# Patient Record
Sex: FEMALE | Race: WHITE
[De-identification: names, ages, dates, MRNs, and addresses within clinical notes are randomized per-mention and may not be internally consistent; named-entity substitution may affect disease eponyms.]

---

## 2017-04-04 ENCOUNTER — HOSPITAL ENCOUNTER (OUTPATIENT)
Dept: HOSPITAL 58 - RAD | Age: 82
Discharge: HOME | End: 2017-04-04
Attending: FAMILY MEDICINE

## 2017-04-04 VITALS — BODY MASS INDEX: 33.4 KG/M2

## 2017-04-04 DIAGNOSIS — M25.551: Primary | ICD-10-CM

## 2017-04-04 NOTE — DI
EXAM:  RIGHT HIP, 2 VIEWS 

  

HISTORY:  Right hip pain with no injury 

  

FINDINGS:  Mild loss of articular cartilage width in the weightbearing portion of the hip joint cons
istent with osteoarthritis.  General bone density is within normal limits.  No fracture or joint dis
location.  No discrete soft tissue abnormality. 

  

  

IMPRESSION: 

Early osteoarthritis of the hip.

## 2017-04-04 NOTE — DI
EXAM:  Radiographs, pelvis 

  

HISTORY:  Right hip pain. 

  

COMPARISON:  None available. 

  

TECHNIQUE:  Single frontal view. 

  

  

FINDINGS:  Bone mineralization is decreased.  There is no fracture or dislocation.  The hip joint sp
aces are maintained.  Degenerative changes of the lower lumbar spine and pubic symphysis noted.  No 
focal soft tissue abnormality is seen. 

  

---------------------------- 

IMPRESSION: 

No fracture or dislocation.

## 2017-06-05 ENCOUNTER — HOSPITAL ENCOUNTER (OUTPATIENT)
Dept: HOSPITAL 58 - RAD | Age: 82
Discharge: HOME | End: 2017-06-05
Attending: FAMILY MEDICINE

## 2017-06-05 VITALS — BODY MASS INDEX: 33.4 KG/M2

## 2017-06-05 DIAGNOSIS — Z86.718: ICD-10-CM

## 2017-06-05 DIAGNOSIS — I82.409: Primary | ICD-10-CM

## 2017-06-05 NOTE — US
EXAM:  Ultrasound venous Doppler bilateral lower extremity 

  

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 

  

COMPARISON:  05/04/2016 

  

TECHNIQUE:  Venous duplex ultrasound of the right and left lower extremity was performed using color
, gray-scale, and Doppler flow imaging. 

  

FINDINGS: 

 Redemonstration of partial compressibility and flow of the left common femoral, profunda femoral, f
emoral, popliteal veins, grossly unchanged to the prior examination. 

  

There is normal color flow and gray scale appearance of the right common femoral, greater saphenous,
 profunda femoral, femoral, popliteal, peroneal, posterior tibial, and anterior tibial veins without
 evidence of intraluminal thrombus.  Compression and augmentation is normal. 

  

IMPRESSION: 

1.  Redemonstration left lower extremity deep venous thrombosis, grossly unchanged from 05/04/2016 

2.  No right lower extremity deep venous thrombosis.

## 2019-07-08 ENCOUNTER — HOSPITAL ENCOUNTER (EMERGENCY)
Dept: HOSPITAL 58 - ED | Age: 84
Discharge: HOME | End: 2019-07-08

## 2019-07-08 VITALS — TEMPERATURE: 98.6 F | SYSTOLIC BLOOD PRESSURE: 198 MMHG | DIASTOLIC BLOOD PRESSURE: 79 MMHG

## 2019-07-08 VITALS — BODY MASS INDEX: 32.3 KG/M2

## 2019-07-08 DIAGNOSIS — I10: ICD-10-CM

## 2019-07-08 DIAGNOSIS — Z79.899: ICD-10-CM

## 2019-07-08 DIAGNOSIS — J02.9: Primary | ICD-10-CM

## 2019-07-08 PROCEDURE — 99283 EMERGENCY DEPT VISIT LOW MDM: CPT

## 2019-07-08 PROCEDURE — 93010 ELECTROCARDIOGRAM REPORT: CPT

## 2019-07-08 PROCEDURE — 36415 COLL VENOUS BLD VENIPUNCTURE: CPT

## 2019-07-08 PROCEDURE — 93005 ELECTROCARDIOGRAM TRACING: CPT

## 2019-07-08 PROCEDURE — 85025 COMPLETE CBC W/AUTO DIFF WBC: CPT

## 2019-07-08 PROCEDURE — 96374 THER/PROPH/DIAG INJ IV PUSH: CPT

## 2019-07-08 PROCEDURE — 80053 COMPREHEN METABOLIC PANEL: CPT

## 2019-07-08 PROCEDURE — 96375 TX/PRO/DX INJ NEW DRUG ADDON: CPT

## 2019-07-08 NOTE — CT
EXAM:  CT NECK 

  

HISTORY:  Neck swelling, difficulty swallowing/breathing 

  

TECHNIQUE:  CT neck without intravenous contrast.  3-mm axial sections.  Coronal and sagittal reforma
tions. 

  

FINDINGS: No comparison CT. 

  

The tongue and possibly lingual tonsillar tissues are prominent in size.  This narrows the oropharyng
eal airway to some degree.  Epiglottis appears normal.  There is no parapharyngeal fluid collection i
dentified.  No lymphadenopathy is seen.  Incidental note of symmetrically enlarged bilateral submandi
bular glands.  The parotid glands are grossly within normal limits.  There are scattered calcificatio
ns in the right parapharyngeal soft tissues and near the anterior left parotid gland which may be dys
trophic in nature or least the left-sided entity could represent a ductal calculus.  These calcificat
ions measure about 2.5 mm or less.  There is no evidence of inflammatory fatty infiltration of the ne
ck or salivary glands.  Mastoid air cells are aerated.  The visualized paranasal sinuses are clear.  
The bones reveal no acute finding.  Upper lung fields are clear. 

  

  

IMPRESSION:  The tongue and possibly lingual tonsillar tissues are prominent in size.  Without compar
keri studies, it is difficult to determine whether this is normal for the patient or evidence of salo
a/inflammation, or other pathology.  This narrows the oropharyngeal airway to some degree.  Epiglotti
s appears normal.  Other findings as described above.

## 2019-07-08 NOTE — ED.PDOC
General


ED Provider: 


Dr. VIJI CASTELLANOS





Chief Complaint: Sore Throat


Stated Complaint: thinks that her throat is closing off no evidence of dyspnea 

, able to drink not short of birth


Time Seen by Physician: 14:30 (no air way issues on arrival  able to drink saw 

with kailey HUFFMAN SAW PT AT 5 PM IN ED )


Mode of Arrival: Walk-In


Information Source: Patient, Family


Exam Limitations: No limitations


Primary Care Provider: 


YUDITH MCCANN





Nursing and Triage Documentation Reviewed and Agree: Yes


Does patient meet sepsis criteria?: No


System Inflammatory Response Syndrome: Not Applicable


Sepsis Protocol: 


For patient's 13 years and over:





Temp is 96.8 and below  and greater


Pulse >90 BPM


Resp >20/minute


Acutely Altered Mental Status





Are patient's symptoms suggestive of a new infection, such as:


   -Pneumonia


   -Skin, Soft Tissue


   -Endocarditis


   -UTI


   -Bone, Joint Infection


   -Implantable Device


   -Acute Abdominal Infection


   -Wound Infection


   -Meningitis


   -Blood Stream Catheter Infection


   -Unknown








EENT Complaint Exam





- Throat Complaint/Exam


Onset/Duration: TODAY


Symptoms Are: Resolved


Timimg: Intermittent


Initial Severity: Mild


Current Severity: None


Aggravating: Reports: None


Alleviating: Reports: None


Associated Signs and Symptoms: Denies: Fever, Dysphagia, Drooling, Foreign body 

sensation, Chills, Cough, Wheezing, Hoarseness, Sinus discomfort, Nasal 

congestion, Difficulty breathing, Lethargy, Irritability, Decreased activity, 

Vomiting, Diarrhea, Decreased hearing, Ear drainage


Uvula Midline: Yes


Marybel-tonsillar Fluctuence: No


Scarlatinaform Rash Present: No


Lesions: Absent: Lip, Gums, Tongue, Buccal Mucosa, Pharynx


Exanthem: Absent: Lip, Gums, Tongue, Buccal Mucosa, Pharynx


Vesicles: Absent: Lip, Gums, Tongue, Buccal Mucosa, Pharynx


Stridor Present: Yes


Sinus Tenderness Present: No


Tonsillar Hypertrophy Present: No


Tonsillar Exudate Present: No


Marybel-tonsillar Swelling Present: No


Adenopathy Present: No


Splenomegaly Present: No


Differential Diagnoses: Pharyngitis





Review of Systems





- Review Of Systems


Constitutional: Reports: No symptoms


Eyes: Reports: No symptoms


Ears, Nose, Mouth, Throat: Reports: Throat pain


Respiratory: Reports: No symptoms


Cardiac: Reports: No symptoms


GI: Reports: No symptoms


: Reports: No symptoms


Musculoskeletal: Reports: No symptoms


Skin: Reports: No symptoms


Neurological: Reports: No symptoms


Endocrine: Reports: No symptoms


Hematologic/Lymphatic: Reports: No symptoms


All Other Systems: Reviewed and Negative





Past Medical History





- Past Medical History


Previously Healthy: Yes


Endocrine: Reports: None


Cardiovascular: Reports: Hypertension


Respiratory: Reports: None


Hematological: Reports: None


Gastrointestinal: Reports: None


Genitourinary: Reports: None


Neuro/Psych: Reports: None


Musculoskeletal: Reports: None


Cancer: Reports: None


Last Menstrual Period: none


Other Pertinent Past Medical History: Angioedema, arrhythmia, hives





- Surgical History


General Surgical History: Reports: None





- Family History


Family History: Reports: None





- Social History


Smoking Status: Never smoker


Hx Substance Use: No


Alcohol Screening: None





Physical Exam





- Physical Exam


Appearance: Well-appearing, No pain distress, Well-nourished


Eyes: ALFRED, EOMI, Conjunctiva clear


ENT: Ears normal, Nose normal, Oropharynx normal


Respiratory: Airway patent, Breath sounds clear, Breath sounds equal, 

Respirations nonlabored


Cardiovascular: RRR, Pulses normal, No rub, No murmur


GI/: Soft, Nontender, No masses, Bowel sounds normal, No Organomegaly


Musculoskeletal: Normal strength, ROM intact, No edema, No calf tenderness


Skin: Warm, Dry, Normal color


Neurological: Sensation intact, Motor intact, Reflexes intact, Cranial nerves 

intact, Alert, Oriented


Psychiatric: Affect appropriate, Mood appropriate





Interpretation





- Radiology Interpretation


Radiology Interpretation By: Radiologist


Radiology Results: Positive (edema)





Critical Care Note





- Critical Care Note


Total Time (mins): 0





Course





- Course


Hematology/Chemistry: 


 07/08/19 15:15





 07/08/19 15:15


Orders, Labs, Meds: 





Lab Review











  07/08/19 07/08/19





  15:15 15:15


 


WBC  5.79 


 


RBC  4.95 


 


Hgb  11.6 L 


 


Hct  39.0 


 


MCV  78.8 L 


 


MCH  23.4 L 


 


MCHC  29.7 L 


 


RDW Coeff of Matt  15.8 H 


 


Plt Count  214 


 


Immature Gran % (Auto)  0.3 


 


Neut % (Auto)  60.9 


 


Lymph % (Auto)  26.6 


 


Mono % (Auto)  8.8 


 


Eos % (Auto)  3.1 


 


Baso % (Auto)  0.3 


 


Immature Gran # (Auto)  0.0 


 


Neut # (Auto)  3.5 


 


Lymph # (Auto)  1.5 


 


Mono # (Auto)  0.5 


 


Eos # (Auto)  0.2 


 


Baso # (Auto)  0.0 


 


Sodium   141.8


 


Potassium   3.77


 


Chloride   101.3


 


Carbon Dioxide   31.1 H


 


Anion Gap   13.17


 


BUN   19.0 H


 


Creatinine   1.15


 


Estimated GFR (MDRD)   45.00


 


BUN/Creatinine Ratio   16.52


 


Glucose   137.1 H


 


Calcium   10.01


 


Total Bilirubin   0.49


 


AST   19.9


 


ALT   16.0


 


Alkaline Phosphatase   82.3


 


Total Protein   7.67


 


Albumin   4.22


 


Globulin   3.45


 


Albumin/Globulin Ratio   1.22








Orders











 Category Date Time Status


 


 EKG-(ED ONLY) Stat CARDIO  07/08/19 15:09 Completed


 


 ED IV/MEDIPORT/POWERPORT .ONCE EMERGENCY  07/08/19 15:09 Active


 


 CBC W/ AUTO DIFF Stat LAB  07/08/19 15:15 Completed


 


 COMPREHENSIVE METABOLIC PANEL Stat LAB  07/08/19 15:15 Completed


 


 0.9 % Sodium Chloride [Saline Flush] MEDS  07/08/19 15:08 Active





 1 syr IVF PRN PRN   


 


 Dexamethasone 4 mg/ml Inj [Decadron 4 mg/ml Sdv] MEDS  07/08/19 15:17 

Discontinued





 4 mg IV ONCE STA   


 


 Famotidine Inj [Pepcid] MEDS  07/08/19 16:17 Discontinued





 20 mg IVP ONCE STA   


 


 CT SOFT TISSUE NECK W/O CONTR Stat RADS  07/08/19 15:12 Completed








Medications











Generic Name Dose Route Start Last Admin





  Trade Name Freq  PRN Reason Stop Dose Admin


 


Sodium Chloride  1 syr  07/08/19 15:08  07/08/19 16:37





  Saline Flush  IVF   1 syr





  PRN PRN   Administration





  To flush IV   





     





     





     














Discontinued Medications














Generic Name Dose Route Start Last Admin





  Trade Name Freq  PRN Reason Stop Dose Admin


 


Dexamethasone Sodium Phosphate  4 mg  07/08/19 15:17  07/08/19 15:25





  Decadron 4 Mg/Ml Sdv  IV  07/08/19 15:18  4 mg





  ONCE STA   Administration





     





     





     





     


 


Famotidine  20 mg  07/08/19 16:17  07/08/19 16:37





  Pepcid  IVP  07/08/19 16:18  20 mg





  ONCE STA   Administration





     





     





     





     











Vital Signs: 





 











  Temp Pulse Resp BP Pulse Ox


 


 07/08/19 14:28  98.6 F  63  20  198/79 H  93 L














Departure





- Departure


Time of Disposition: 17:23


Disposition: HOME SELF-CARE


Discharge Problem: 


 Sore throat symptom





Instructions:  Pharyngitis (ED)


Condition: Good


Pt referred to PMD for follow-up: Yes


IPMP verified?: No


Additional Instructions: 


Please call your Family Physician as soon as possible to schedule a follow-up 

appointment.RETURN IF YOUR THROAT IS  CLOSING


Allergies/Adverse Reactions: 


Allergies





ACE Inhibitors Allergy (Severe, Verified 07/08/19 14:32)


 Swelling


 angioedema requires epi pen use 


clonidine Allergy (Severe, Verified 07/08/19 14:32)


 Swelling


 tongue swelling 


diphenhydramine [From Benadryl] Adverse Reaction (Verified 07/08/19 14:32)


 








Home Medications: 


Ambulatory Orders





Digoxin [Lanoxin] 125 mcg PO DAILY 08/11/13 


Fexofenadine HCl [Allegra Allergy] 180 mg PO DAILY PRN 08/11/13 


Amlodipine Besylate [Norvasc] 5 mg PO DAILY 08/28/15 


Bisoprolol Fumarate/Hctz [Ziac 5-6.25 mg] 1 tab PO DAILY 08/28/15 


Ranitidine HCl [Zantac] 150 mg PO DAILY 08/28/15 


Epinephrine [Epipen Twinpak] 0.3 mg IM PRN PRN #1 pen.injctr 12/03/18 


Sitagliptin Phosphate [Januvia] 25 mg PO DAILY 07/08/19 








Disposition Discussed With: Patient

## 2019-08-26 ENCOUNTER — HOSPITAL ENCOUNTER (OUTPATIENT)
Dept: HOSPITAL 58 - RAD | Age: 84
Discharge: HOME | End: 2019-08-26
Attending: FAMILY MEDICINE

## 2019-08-26 VITALS — BODY MASS INDEX: 32.3 KG/M2

## 2019-08-26 DIAGNOSIS — R05: Primary | ICD-10-CM

## 2019-08-27 NOTE — DI
EXAM:  Two views of the chest. 

  

History:  Cough. 

  

Comparison:  Chest radiograph 08/28/2015 

  

Findings:  Heart is enlarged.  No overt heart failure.  No pleural fluid and no pneumothorax.  No con
solidated pneumonia.  No acute osseous abnormalities. 

  

Impression:  Cardiomegaly without overt heart failure